# Patient Record
Sex: MALE | Race: WHITE | NOT HISPANIC OR LATINO | Employment: OTHER | URBAN - METROPOLITAN AREA
[De-identification: names, ages, dates, MRNs, and addresses within clinical notes are randomized per-mention and may not be internally consistent; named-entity substitution may affect disease eponyms.]

---

## 2023-04-21 ENCOUNTER — HOSPITAL ENCOUNTER (EMERGENCY)
Facility: CLINIC | Age: 78
Discharge: HOME | End: 2023-04-22
Attending: EMERGENCY MEDICINE | Admitting: EMERGENCY MEDICINE
Payer: MEDICARE

## 2023-04-21 DIAGNOSIS — R55 SYNCOPE AND COLLAPSE: ICD-10-CM

## 2023-04-21 PROCEDURE — 99284 EMERGENCY DEPT VISIT MOD MDM: CPT

## 2023-04-21 PROCEDURE — 93005 ELECTROCARDIOGRAM TRACING: CPT

## 2023-04-21 ASSESSMENT — ENCOUNTER SYMPTOMS
HEADACHES: 0
DIAPHORESIS: 1
NAUSEA: 1
BLOOD IN STOOL: 0
SHORTNESS OF BREATH: 0
PHOTOPHOBIA: 1

## 2023-04-21 ASSESSMENT — ACTIVITIES OF DAILY LIVING (ADL): ADLS_ACUITY_SCORE: 35

## 2023-04-21 NOTE — LETTER
April 22, 2023      To Whom It May Concern:      Baldev OSCAR Green Jr. was seen in our Emergency Department today, 04/22/23.    He may return to fly and is medically cleared.    Sincerely,        Leonel May MD

## 2023-04-22 VITALS
HEART RATE: 66 BPM | DIASTOLIC BLOOD PRESSURE: 84 MMHG | OXYGEN SATURATION: 93 % | SYSTOLIC BLOOD PRESSURE: 168 MMHG | WEIGHT: 201 LBS | HEIGHT: 69 IN | RESPIRATION RATE: 14 BRPM | BODY MASS INDEX: 29.77 KG/M2 | TEMPERATURE: 97.2 F

## 2023-04-22 LAB
ALBUMIN SERPL BCG-MCNC: 4.1 G/DL (ref 3.5–5.2)
ALP SERPL-CCNC: 83 U/L (ref 40–129)
ALT SERPL W P-5'-P-CCNC: 23 U/L (ref 10–50)
ANION GAP SERPL CALCULATED.3IONS-SCNC: 10 MMOL/L (ref 7–15)
AST SERPL W P-5'-P-CCNC: 26 U/L (ref 10–50)
BASOPHILS # BLD AUTO: 0 10E3/UL (ref 0–0.2)
BASOPHILS NFR BLD AUTO: 0 %
BILIRUB SERPL-MCNC: 0.4 MG/DL
BUN SERPL-MCNC: 21.7 MG/DL (ref 8–23)
CALCIUM SERPL-MCNC: 9.3 MG/DL (ref 8.8–10.2)
CHLORIDE SERPL-SCNC: 101 MMOL/L (ref 98–107)
CREAT SERPL-MCNC: 1.51 MG/DL (ref 0.67–1.17)
DEPRECATED HCO3 PLAS-SCNC: 26 MMOL/L (ref 22–29)
EOSINOPHIL # BLD AUTO: 0.1 10E3/UL (ref 0–0.7)
EOSINOPHIL NFR BLD AUTO: 1 %
ERYTHROCYTE [DISTWIDTH] IN BLOOD BY AUTOMATED COUNT: 13.1 % (ref 10–15)
GFR SERPL CREATININE-BSD FRML MDRD: 48 ML/MIN/1.73M2
GLUCOSE SERPL-MCNC: 106 MG/DL (ref 70–99)
HCT VFR BLD AUTO: 41.6 % (ref 40–53)
HGB BLD-MCNC: 14.2 G/DL (ref 13.3–17.7)
IMM GRANULOCYTES # BLD: 0 10E3/UL
IMM GRANULOCYTES NFR BLD: 0 %
LYMPHOCYTES # BLD AUTO: 1.1 10E3/UL (ref 0.8–5.3)
LYMPHOCYTES NFR BLD AUTO: 10 %
MCH RBC QN AUTO: 35.4 PG (ref 26.5–33)
MCHC RBC AUTO-ENTMCNC: 34.1 G/DL (ref 31.5–36.5)
MCV RBC AUTO: 104 FL (ref 78–100)
MONOCYTES # BLD AUTO: 0.8 10E3/UL (ref 0–1.3)
MONOCYTES NFR BLD AUTO: 8 %
NEUTROPHILS # BLD AUTO: 8.3 10E3/UL (ref 1.6–8.3)
NEUTROPHILS NFR BLD AUTO: 81 %
NRBC # BLD AUTO: 0 10E3/UL
NRBC BLD AUTO-RTO: 0 /100
PLATELET # BLD AUTO: 149 10E3/UL (ref 150–450)
POTASSIUM SERPL-SCNC: 4.3 MMOL/L (ref 3.4–5.3)
PROT SERPL-MCNC: 7.1 G/DL (ref 6.4–8.3)
RBC # BLD AUTO: 4.01 10E6/UL (ref 4.4–5.9)
SODIUM SERPL-SCNC: 137 MMOL/L (ref 136–145)
TROPONIN T SERPL HS-MCNC: 21 NG/L
WBC # BLD AUTO: 10.4 10E3/UL (ref 4–11)

## 2023-04-22 PROCEDURE — 36415 COLL VENOUS BLD VENIPUNCTURE: CPT | Performed by: EMERGENCY MEDICINE

## 2023-04-22 PROCEDURE — 96360 HYDRATION IV INFUSION INIT: CPT

## 2023-04-22 PROCEDURE — 258N000003 HC RX IP 258 OP 636: Performed by: EMERGENCY MEDICINE

## 2023-04-22 PROCEDURE — 80053 COMPREHEN METABOLIC PANEL: CPT | Performed by: EMERGENCY MEDICINE

## 2023-04-22 PROCEDURE — 85025 COMPLETE CBC W/AUTO DIFF WBC: CPT | Performed by: EMERGENCY MEDICINE

## 2023-04-22 PROCEDURE — 84484 ASSAY OF TROPONIN QUANT: CPT | Performed by: EMERGENCY MEDICINE

## 2023-04-22 RX ORDER — ALBUTEROL SULFATE 90 UG/1
2 AEROSOL, METERED RESPIRATORY (INHALATION)
COMMUNITY
Start: 2021-06-25

## 2023-04-22 RX ORDER — COVID-19 ANTIGEN TEST
1 KIT MISCELLANEOUS AT BEDTIME
COMMUNITY

## 2023-04-22 RX ORDER — LOSARTAN POTASSIUM 100 MG/1
1 TABLET ORAL DAILY
COMMUNITY
Start: 2022-11-11

## 2023-04-22 RX ORDER — LEVOTHYROXINE SODIUM 112 UG/1
112 TABLET ORAL
COMMUNITY
Start: 2022-07-17

## 2023-04-22 RX ORDER — ZALEPLON 5 MG/1
5-10 CAPSULE ORAL
COMMUNITY
Start: 2022-10-27

## 2023-04-22 RX ORDER — CARVEDILOL 25 MG/1
TABLET ORAL
COMMUNITY
Start: 2022-07-14

## 2023-04-22 RX ORDER — VALACYCLOVIR HYDROCHLORIDE 500 MG/1
1 TABLET, FILM COATED ORAL 2 TIMES DAILY
COMMUNITY
Start: 2022-11-28

## 2023-04-22 RX ADMIN — SODIUM CHLORIDE 1000 ML: 9 INJECTION, SOLUTION INTRAVENOUS at 00:25

## 2023-04-22 ASSESSMENT — ACTIVITIES OF DAILY LIVING (ADL): ADLS_ACUITY_SCORE: 35

## 2023-04-22 NOTE — ED PROVIDER NOTES
"  History     Chief Complaint:  syncope     The history is provided by the patient and the spouse.      Baldev Green is a 77 year old male with a history of hypertension who presents with syncope. The patient is from [place] and was on an airplane, coming from [facility] on his way to [place] when he began to experience an \"optical migraine\" of photophobia from flashing lights in the plane that he thinks could've been brought on by the lights on the plane and watching TV on the flight from [facility]. He also recalls being nauseated and diaphoretic as the heat on the plane was very high.The patients wife states that the patient then had a syncopal episode around 2100 where she couldn't wake him with some shaking. The patient states he does not remember the episode but does remember his vision getting better after some fluids. He mentions taking one of his blood pressure medications prior to the flight on an empty stomach and wonders if that could have attributed to this episode. He denies any headache, chest pain, shortness of breath, or blood in stool.    Independent Historian:   Spouse/Partner - They report as above      ROS:  Review of Systems   Constitutional: Positive for diaphoresis.   Eyes: Positive for photophobia.   Respiratory: Negative for shortness of breath.    Cardiovascular: Negative for chest pain.   Gastrointestinal: Positive for nausea. Negative for blood in stool.   Neurological: Positive for syncope. Negative for headaches.   All other systems reviewed and are negative.    Allergies:  Adhesive Tape  Escitalopram  Lidocaine  Sulfa Drugs      Medications:    Naproxen   Coreg   Synthroid   Sonata   Cozaar   Valtrex     Past Medical History:    Hypertension   Dyslipidemia   Macrocytosis without anemia   Herpes simplex   Kidney stone   RLE cellulitis      Past Surgical History:    Tonsillectomy   Total hip replacement   Vasectomy   Cataracts   Appendectomy   Joint replacement      Family History:  " "  Cancer   Dementia   Lung cancer   Breast cancer     Social History:  Patient came from the airport.  Patient is by accompanied in the ED by wife.  The patient is from [place]     PCP: No primary care provider on file.     Physical Exam     Patient Vitals for the past 24 hrs:   BP Temp Temp src Pulse Resp SpO2 Height Weight   04/22/23 0145 (!) 168/84 -- -- 66 14 -- -- --   04/22/23 0100 -- -- -- 60 12 93 % -- --   04/22/23 0045 (!) 160/78 -- -- 58 16 92 % -- --   04/21/23 2305 107/62 97.2  F (36.2  C) Temporal 53 16 96 % 1.753 m (5' 9\") 91.2 kg (201 lb)        Physical Exam  Constitutional:  Oriented to person, place, and time. Well appearing   HENT:   Head:    Normocephalic.   Mouth/Throat:   Oropharynx is clear and moist.   Eyes:    EOM are normal. Pupils are equal, round, and reactive to light.   Neck:    Neck supple.   Cardiovascular:  Normal rate, regular rhythm and normal heart sounds.      Exam reveals no gallop and no friction rub.       No murmur heard.  Pulmonary/Chest:  Effort normal and breath sounds normal.      No respiratory distress. No wheezes. No rales.      No reproducible chest wall pain.  Abdominal:   Soft. No distension. No tenderness. No rebound and no guarding.   Musculoskeletal:  Normal range of motion.   Neurological:   Alert and oriented to person, place, and time.           Moves all 4 extremities spontaneously Cranial nerves 2-12 grossly normal. No meningeal signs. No ataxia. 5/5 strength and sensation intact to light touch in all 4 extremities.   Skin:    No rash noted. No pallor.     Emergency Department Course     ECG results from 04/21/23   EKG 12-lead, tracing only     Value    Systolic Blood Pressure     Diastolic Blood Pressure     Ventricular Rate 62    Atrial Rate 62    DE Interval 162    QRS Duration 84        QTc 438    P Axis 54    R AXIS 30    T Axis -2    Interpretation ECG      Sinus rhythm  Nonspecific T wave abnormality  Abnormal ECG  No previous ECGs available   "     Laboratory:  Labs Ordered and Resulted from Time of ED Arrival to Time of ED Departure   COMPREHENSIVE METABOLIC PANEL - Abnormal       Result Value    Sodium 137      Potassium 4.3      Chloride 101      Carbon Dioxide (CO2) 26      Anion Gap 10      Urea Nitrogen 21.7      Creatinine 1.51 (*)     Calcium 9.3      Glucose 106 (*)     Alkaline Phosphatase 83      AST 26      ALT 23      Protein Total 7.1      Albumin 4.1      Bilirubin Total 0.4      GFR Estimate 48 (*)    CBC WITH PLATELETS AND DIFFERENTIAL - Abnormal    WBC Count 10.4      RBC Count 4.01 (*)     Hemoglobin 14.2      Hematocrit 41.6       (*)     MCH 35.4 (*)     MCHC 34.1      RDW 13.1      Platelet Count 149 (*)     % Neutrophils 81      % Lymphocytes 10      % Monocytes 8      % Eosinophils 1      % Basophils 0      % Immature Granulocytes 0      NRBCs per 100 WBC 0      Absolute Neutrophils 8.3      Absolute Lymphocytes 1.1      Absolute Monocytes 0.8      Absolute Eosinophils 0.1      Absolute Basophils 0.0      Absolute Immature Granulocytes 0.0      Absolute NRBCs 0.0     TROPONIN T, HIGH SENSITIVITY - Normal    Troponin T, High Sensitivity 21        Emergency Department Course & Assessments:       Interventions:  Medications   0.9% sodium chloride BOLUS (0 mLs Intravenous Stopped 23 0144)      Assessments:  2324 I consulted with the patient and obtained history as shown above  0133 I rechecked the patient and explained exam results       Disposition:  The patient was discharged to home.     Impression & Plan    CMS Diagnoses: None    Medical Decision Makin-year-old male that came in status post syncopal event.  Differential includes vasovagal syncope, arrhythmia, ACS, or other causes.  Work-up here is otherwise reassuring.  Symptoms would suggest vasovagal syncope although remains be seen.  He is asymptomatic and feels improved I would doubt PE or need for work-up.  At this time is otherwise appropriate for discharge  told his oral hydration follow-up with his primary care doctor and return for any worsening symptoms or concerns further episodes of syncope      Diagnosis:    ICD-10-CM    1. Syncope and collapse  R55            Discharge Medications:  New Prescriptions    No medications on file      Scribe Disclosure:  I, Marcel Jarrett, am serving as a scribe at 11:39 PM on 4/21/2023 to document services personally performed by Leonel May MD based on my observations and the provider's statements to me.     4/21/2023   Leonel May MD Shapin, Ryan P, MD  04/22/23 0317

## 2023-04-22 NOTE — ED NOTES
Discharged with wife.   Denied pain. Declined second EKG.   All belongings sent with patient.   AVS and discharge instructions given and explained to patient, verbalized understanding.

## 2023-04-22 NOTE — ED NOTES
Bed: ED11  Expected date:   Expected time:   Means of arrival:   Comments:  542 77M syncope after a flight

## 2023-04-22 NOTE — ED TRIAGE NOTES
"Pt arrives per EMS from airport for medical evaluation. Pt was on flight to [place] when he reports he began to have an ocular migraine and closed his eyes. Wife became concerned when pt would not respond and pt did not respond to her or  for approx 5 minutes. Pt states he was aware the entire time and was not \"unresponsive\" but airline needs him to be medically clear to be able to fly. Neuro's wnl. Pt alert and oriented.      Triage Assessment     Row Name 04/21/23 5687       Triage Assessment (Adult)    Airway WDL WDL       Respiratory WDL    Respiratory WDL WDL       Skin Circulation/Temperature WDL    Skin Circulation/Temperature WDL WDL       Cardiac WDL    Cardiac WDL WDL       Peripheral/Neurovascular WDL    Peripheral Neurovascular WDL WDL       Cognitive/Neuro/Behavioral WDL    Cognitive/Neuro/Behavioral WDL X    Level of Consciousness alert    Arousal Level opens eyes spontaneously    Orientation oriented x 4    Speech clear;logical;spontaneous    Mood/Behavior calm;cooperative       Pupils (CN II)    Pupil PERRLA yes    Pupil Size Left 3 mm    Pupil Size Right 3 mm       [place] Coma Scale    Best Eye Response 4-->(E4) spontaneous    Best Motor Response 6-->(M6) obeys commands    Best Verbal Response 5-->(V5) oriented    [place] Coma Scale Score 15              "

## 2023-04-23 LAB
ATRIAL RATE - MUSE: 62 BPM
DIASTOLIC BLOOD PRESSURE - MUSE: NORMAL MMHG
INTERPRETATION ECG - MUSE: NORMAL
P AXIS - MUSE: 54 DEGREES
PR INTERVAL - MUSE: 162 MS
QRS DURATION - MUSE: 84 MS
QT - MUSE: 432 MS
QTC - MUSE: 438 MS
R AXIS - MUSE: 30 DEGREES
SYSTOLIC BLOOD PRESSURE - MUSE: NORMAL MMHG
T AXIS - MUSE: -2 DEGREES
VENTRICULAR RATE- MUSE: 62 BPM